# Patient Record
Sex: MALE | Race: WHITE | NOT HISPANIC OR LATINO | URBAN - NONMETROPOLITAN AREA
[De-identification: names, ages, dates, MRNs, and addresses within clinical notes are randomized per-mention and may not be internally consistent; named-entity substitution may affect disease eponyms.]

---

## 2020-03-05 ENCOUNTER — IMPORTED ENCOUNTER (OUTPATIENT)
Dept: URBAN - NONMETROPOLITAN AREA CLINIC 1 | Facility: CLINIC | Age: 70
End: 2020-03-05

## 2020-03-05 PROBLEM — H26.492: Noted: 2020-03-05

## 2020-03-05 PROBLEM — Z96.1: Noted: 2020-03-05

## 2020-03-05 PROCEDURE — 92004 COMPRE OPH EXAM NEW PT 1/>: CPT

## 2020-03-05 NOTE — PATIENT DISCUSSION
pseudophakia oumonitorPCO os-Explained PCO and RBAs of YAG Capsulotomy to pt. -Pt elects to proceed.  refer for yag consult with dr. Anita Graff

## 2020-05-22 ENCOUNTER — IMPORTED ENCOUNTER (OUTPATIENT)
Dept: URBAN - NONMETROPOLITAN AREA CLINIC 1 | Facility: CLINIC | Age: 70
End: 2020-05-22

## 2020-05-22 PROBLEM — Z96.1: Noted: 2020-05-22

## 2020-05-22 PROBLEM — H26.492: Noted: 2020-05-22

## 2020-05-22 PROCEDURE — 66821 AFTER CATARACT LASER SURGERY: CPT

## 2020-05-22 PROCEDURE — 92014 COMPRE OPH EXAM EST PT 1/>: CPT

## 2020-05-22 NOTE — PATIENT DISCUSSION
PCO-Explained PCO and RBAs of YAG Capsulotomy to pt. -Pt elects to proceed. YAG Caps OS today. 1-2 week PO w/ Dr. Danyell Huerta

## 2022-04-09 ASSESSMENT — VISUAL ACUITY
OD_CC: 20/100-1
OS_CC: 20/20
OD_GLARE: 20/100-1
OS_CC: 20/40
OS_GLARE: 20/50+
OD_CC: 20/50

## 2022-04-09 ASSESSMENT — TONOMETRY
OS_IOP_MMHG: 19
OD_IOP_MMHG: 19
OS_IOP_MMHG: 17
OD_IOP_MMHG: 18